# Patient Record
Sex: FEMALE | Race: WHITE | NOT HISPANIC OR LATINO | Employment: UNEMPLOYED | URBAN - METROPOLITAN AREA
[De-identification: names, ages, dates, MRNs, and addresses within clinical notes are randomized per-mention and may not be internally consistent; named-entity substitution may affect disease eponyms.]

---

## 2020-04-01 ENCOUNTER — OFFICE VISIT (OUTPATIENT)
Dept: URGENT CARE | Facility: CLINIC | Age: 25
End: 2020-04-01
Payer: COMMERCIAL

## 2020-04-01 VITALS
HEART RATE: 109 BPM | SYSTOLIC BLOOD PRESSURE: 130 MMHG | HEIGHT: 65 IN | WEIGHT: 183.8 LBS | OXYGEN SATURATION: 100 % | BODY MASS INDEX: 30.62 KG/M2 | RESPIRATION RATE: 18 BRPM | DIASTOLIC BLOOD PRESSURE: 100 MMHG | TEMPERATURE: 98.5 F

## 2020-04-01 DIAGNOSIS — J02.9 SORE THROAT: ICD-10-CM

## 2020-04-01 DIAGNOSIS — J02.0 STREP PHARYNGITIS: Primary | ICD-10-CM

## 2020-04-01 LAB — S PYO AG THROAT QL: POSITIVE

## 2020-04-01 PROCEDURE — 87880 STREP A ASSAY W/OPTIC: CPT | Performed by: NURSE PRACTITIONER

## 2020-04-01 PROCEDURE — 99213 OFFICE O/P EST LOW 20 MIN: CPT | Performed by: NURSE PRACTITIONER

## 2020-04-01 RX ORDER — METHYLPREDNISOLONE 4 MG/1
TABLET ORAL
Qty: 21 TABLET | Refills: 0 | Status: SHIPPED | OUTPATIENT
Start: 2020-04-01

## 2020-04-01 RX ORDER — METHYLPREDNISOLONE SODIUM SUCCINATE 40 MG/ML
60 INJECTION, POWDER, LYOPHILIZED, FOR SOLUTION INTRAMUSCULAR; INTRAVENOUS ONCE
Status: COMPLETED | OUTPATIENT
Start: 2020-04-01 | End: 2020-04-01

## 2020-04-01 RX ORDER — CLINDAMYCIN HYDROCHLORIDE 300 MG/1
300 CAPSULE ORAL 3 TIMES DAILY
Qty: 30 CAPSULE | Refills: 0 | Status: SHIPPED | OUTPATIENT
Start: 2020-04-01 | End: 2020-04-11

## 2020-04-01 RX ADMIN — METHYLPREDNISOLONE SODIUM SUCCINATE 60 MG: 40 INJECTION, POWDER, LYOPHILIZED, FOR SOLUTION INTRAMUSCULAR; INTRAVENOUS at 10:16

## 2022-02-08 ENCOUNTER — TELEPHONE (OUTPATIENT)
Dept: PSYCHIATRY | Facility: CLINIC | Age: 27
End: 2022-02-08

## 2022-03-01 ENCOUNTER — OFFICE VISIT (OUTPATIENT)
Dept: URGENT CARE | Facility: CLINIC | Age: 27
End: 2022-03-01
Payer: MEDICARE

## 2022-03-01 VITALS — TEMPERATURE: 97.4 F | HEART RATE: 75 BPM | OXYGEN SATURATION: 98 % | RESPIRATION RATE: 18 BRPM

## 2022-03-01 DIAGNOSIS — H61.21 IMPACTED CERUMEN OF RIGHT EAR: Primary | ICD-10-CM

## 2022-03-01 PROCEDURE — 69209 REMOVE IMPACTED EAR WAX UNI: CPT | Performed by: NURSE PRACTITIONER

## 2022-03-01 PROCEDURE — 99213 OFFICE O/P EST LOW 20 MIN: CPT | Performed by: NURSE PRACTITIONER

## 2022-03-01 NOTE — PROGRESS NOTES
[facility]        NAME: Sanford Espinoza is a 31 y o  female  : 1995    MRN: [id number]  DATE: 2022  TIME: 8:59 AM    Assessment and Plan   Impacted cerumen of right ear [H61 21]  1  Impacted cerumen of right ear       Ear cerumen removal    Date/Time: 3/1/2022 9:28 AM  Performed by: Anam Sims  Authorized by: JUNAID Khan   Universal Protocol:  Consent: Verbal consent obtained  Risks and benefits: risks, benefits and alternatives were discussed  Consent given by: patient  Time out: Immediately prior to procedure a "time out" was called to verify the correct patient, procedure, equipment, support staff and site/side marked as required  Patient understanding: patient states understanding of the procedure being performed  Patient identity confirmed: verbally with patient      Patient location:  Clinic  Procedure details:     Location:  R ear    Procedure type: irrigation only      Approach:  External  Post-procedure details:     Complication:  None    Hearing quality:  Normal    Patient tolerance of procedure: Tolerated well, no immediate complications  Comments:      Incomplete removal          Patient Instructions     Patient Instructions   Recommend using OTC debrox as directed as wax is impacted  Return if symptoms persist  Advise f/u with ENT          Follow up with PCP in 3-5 days  Proceed to  ER if symptoms worsen  Chief Complaint     Chief Complaint   Patient presents with    ringing in the ears     started last night         History of Present Illness       Earache   There is pain in the right (denies pain) ear  This is a new problem  The current episode started yesterday  The problem has been unchanged  There has been no fever  The patient is experiencing no pain  Pertinent negatives include no coughing, ear discharge, headaches, hearing loss, rhinorrhea, sore throat or vomiting  Associated symptoms comments: tinnitus      She has tried ear drops for the symptoms  The treatment provided no relief  There is no history of a chronic ear infection, hearing loss or a tympanostomy tube  Review of Systems   Review of Systems   Constitutional: Negative for chills, diaphoresis, fatigue and fever  HENT: Positive for ear pain and tinnitus  Negative for congestion, ear discharge, hearing loss, rhinorrhea, sinus pressure, sinus pain, sore throat and trouble swallowing  Respiratory: Negative for cough, shortness of breath, wheezing and stridor  Cardiovascular: Negative  Gastrointestinal: Negative for vomiting  Neurological: Negative for dizziness, light-headedness and headaches  Current Medications       Current Outpatient Medications:     methylprednisolone (MEDROL) 4 mg tablet, Medrol dose pack Take as directed  (Patient not taking: Reported on 3/1/2022 ), Disp: 21 tablet, Rfl: 0    Current Allergies     Allergies as of 03/01/2022 - Reviewed 03/01/2022   Allergen Reaction Noted    Amoxicillin  04/01/2020            The following portions of the patient's history were reviewed and updated as appropriate: allergies, current medications, past family history, past medical history, past social history, past surgical history and problem list      Past Medical History:   Diagnosis Date    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[facility])        History reviewed  No pertinent surgical history  Family History   Problem Relation Age of Onset    COPD Mother     Bipolar disorder Mother     Schizoaffective Disorder  Mother          Medications have been verified  Objective   Pulse 75   Temp (!) 97 4 °F (36 3 °C)   Resp 18   SpO2 98%   No LMP recorded  Patient has had an injection  Physical Exam     Physical Exam  Constitutional:       General: She is not in acute distress  Appearance: She is well-developed  She is not diaphoretic  HENT:      Head: Normocephalic and atraumatic        Right Ear: Hearing, ear canal and external ear normal  There is impacted cerumen  Left Ear: Hearing, tympanic membrane, ear canal and external ear normal       Nose: Nose normal       Right Sinus: No maxillary sinus tenderness or frontal sinus tenderness  Left Sinus: No maxillary sinus tenderness or frontal sinus tenderness  Mouth/Throat:      Pharynx: Oropharynx is clear  Uvula midline  Cardiovascular:      Rate and Rhythm: Normal rate and regular rhythm  Heart sounds: Normal heart sounds, S1 normal and S2 normal    Pulmonary:      Effort: Pulmonary effort is normal       Breath sounds: Normal breath sounds  Lymphadenopathy:      Cervical: No cervical adenopathy  Skin:     General: Skin is warm and dry  Neurological:      Mental Status: She is alert and oriented to person, place, and time

## 2022-03-01 NOTE — PATIENT INSTRUCTIONS
Recommend using OTC debrox as directed as wax is impacted   Return if symptoms persist  Advise f/u with ENT

## 2022-07-15 ENCOUNTER — OFFICE VISIT (OUTPATIENT)
Dept: URGENT CARE | Facility: CLINIC | Age: 27
End: 2022-07-15
Payer: MEDICARE

## 2022-07-15 VITALS
WEIGHT: 202 LBS | DIASTOLIC BLOOD PRESSURE: 86 MMHG | HEIGHT: 65 IN | RESPIRATION RATE: 18 BRPM | SYSTOLIC BLOOD PRESSURE: 138 MMHG | HEART RATE: 86 BPM | OXYGEN SATURATION: 98 % | TEMPERATURE: 97.5 F | BODY MASS INDEX: 33.66 KG/M2

## 2022-07-15 DIAGNOSIS — H60.331 ACUTE SWIMMER'S EAR OF RIGHT SIDE: Primary | ICD-10-CM

## 2022-07-15 PROCEDURE — 99213 OFFICE O/P EST LOW 20 MIN: CPT | Performed by: NURSE PRACTITIONER

## 2022-07-15 RX ORDER — OFLOXACIN 3 MG/ML
10 SOLUTION AURICULAR (OTIC) DAILY
Qty: 5 ML | Refills: 0 | Status: SHIPPED | OUTPATIENT
Start: 2022-07-15

## 2022-07-15 NOTE — PATIENT INSTRUCTIONS
Use drops as directed  Recommend over-the-counter Tylenol or Motrin as needed for pain  If you develop any increased pain, swelling, prolonged high fever, dizziness, or any new or concerning symptoms please return or proceed ER  Advised follow-up with PCP in 3 to 5 days

## 2022-07-15 NOTE — PROGRESS NOTES
[facility]        NAME: Fanta Addison is a 31 y o  female  : 1995    MRN: [id number]  DATE: July 15, 2022  TIME: 5:34 PM    Assessment and Plan   Acute swimmer's ear of right side [H60 331]  1  Acute swimmer's ear of right side  ofloxacin (FLOXIN) 0 3 % otic solution         Patient Instructions     Patient Instructions   Use drops as directed  Recommend over-the-counter Tylenol or Motrin as needed for pain  If you develop any increased pain, swelling, prolonged high fever, dizziness, or any new or concerning symptoms please return or proceed ER  Advised follow-up with PCP in 3 to 5 days  Follow up with PCP in 3-5 days  Proceed to  ER if symptoms worsen  Chief Complaint     Chief Complaint   Patient presents with    Earache     Patient c/o of right ear pain that started a few days ago  History of Present Illness       Earache   There is pain in the right ear  This is a new problem  Episode onset: 3 days ago  The problem occurs constantly  The problem has been unchanged  There has been no fever  The pain is moderate  Pertinent negatives include no abdominal pain, coughing, diarrhea, ear discharge, headaches, hearing loss, neck pain, rash, rhinorrhea, sore throat or vomiting  She has tried nothing for the symptoms  Her past medical history is significant for a tympanostomy tube (as a child)  There is no history of a chronic ear infection or hearing loss  Review of Systems   Review of Systems   Constitutional: Negative for chills, diaphoresis, fatigue and fever  HENT: Positive for ear pain  Negative for congestion, ear discharge, facial swelling, hearing loss, mouth sores, postnasal drip, rhinorrhea, sinus pressure, sinus pain, sore throat and trouble swallowing  Eyes: Negative for photophobia and visual disturbance  Respiratory: Negative for cough, chest tightness and shortness of breath  Cardiovascular: Negative for chest pain     Gastrointestinal: Negative for abdominal pain, diarrhea, nausea and vomiting  Genitourinary: Negative  Musculoskeletal: Negative for arthralgias, back pain, joint swelling, myalgias, neck pain and neck stiffness  Skin: Negative for rash  Neurological: Negative for dizziness, facial asymmetry, weakness, light-headedness, numbness and headaches  Current Medications       Current Outpatient Medications:     ofloxacin (FLOXIN) 0 3 % otic solution, Administer 10 drops to the right ear daily, Disp: 5 mL, Rfl: 0    methylprednisolone (MEDROL) 4 mg tablet, Medrol dose pack Take as directed  (Patient not taking: No sig reported), Disp: 21 tablet, Rfl: 0    Current Allergies     Allergies as of 07/15/2022 - Reviewed 07/15/2022   Allergen Reaction Noted    Amoxicillin  04/01/2020            The following portions of the patient's history were reviewed and updated as appropriate: allergies, current medications, past family history, past medical history, past social history, past surgical history and problem list      Past Medical History:   Diagnosis Date    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[facility])        History reviewed  No pertinent surgical history  Family History   Problem Relation Age of Onset    COPD Mother     Bipolar disorder Mother     Schizoaffective Disorder  Mother          Medications have been verified  Objective   /86   Pulse 86   Temp 97 5 °F (36 4 °C) (Temporal)   Resp 18   Ht 5' 5" (1 651 m)   Wt 91 6 kg (202 lb)   SpO2 98%   BMI 33 61 kg/m²   No LMP recorded  Physical Exam     Physical Exam  Constitutional:       General: She is not in acute distress  Appearance: She is well-developed  HENT:      Head: Normocephalic and atraumatic  Right Ear: Hearing, tympanic membrane, ear canal and external ear normal  Drainage, swelling (of right ear canal) and tenderness present  Tympanic membrane is not erythematous        Left Ear: Hearing, tympanic membrane, ear canal and external ear normal  Tympanic membrane is not erythematous  Nose: Nose normal       Right Sinus: No maxillary sinus tenderness or frontal sinus tenderness  Left Sinus: No maxillary sinus tenderness or frontal sinus tenderness  Mouth/Throat:      Mouth: Mucous membranes are moist       Pharynx: Oropharynx is clear  Uvula midline  No oropharyngeal exudate or posterior oropharyngeal erythema  Eyes:      Conjunctiva/sclera: Conjunctivae normal       Pupils: Pupils are equal, round, and reactive to light  Cardiovascular:      Rate and Rhythm: Normal rate and regular rhythm  Heart sounds: Normal heart sounds, S1 normal and S2 normal    Pulmonary:      Effort: Pulmonary effort is normal       Breath sounds: Normal breath sounds and air entry  Lymphadenopathy:      Cervical: No cervical adenopathy  Skin:     General: Skin is warm and dry  Capillary Refill: Capillary refill takes less than 2 seconds  Neurological:      Mental Status: She is alert and oriented to person, place, and time

## 2023-09-05 ENCOUNTER — TELEPHONE (OUTPATIENT)
Dept: PSYCHIATRY | Facility: CLINIC | Age: 28
End: 2023-09-05

## 2023-09-05 NOTE — LETTER
Dear Michele Fears : We are contacting you because your name is currently included on the [address] [place] [address] wait-list for Talk Therapy and/or Medication Management. (Please [place] which services are needed)     In our efforts to provide the highest quality care, [facility] has begun the process of upgrading our behavioral health systems to increase efficiency and expedite delivery of services. As part of this process, we ask you to please confirm your continued interest in the services above. If you are no longer interested or in need, please zackery “No” in the area below. If you are still interested and in need, please zackery “Yes” and provide your most current demographic and insurance information within 15 days. If we do not receive confirmation from you by 2023 your information will not be included in the system upgrade and your place on the waitlist will be lost.     Thank you in advance for your patience and understanding and we apologize for any inconvenience this may cause. Patient Name and :    Still in need of services: Yes or No     Current Address:     Phone#:     Best time to receive a call: Insurance Carrier:      Policy/ID#: Group#: Insurance Services Phone#:      What is your current presenting problem? Open to virtual talk therapy: Yes or No      We will call you to do an Intake when an appointment becomes available. You can send this information back to us in any of the ways below:    Email: [email or web address] Anamaria Tellez  Fax#:  [phone]  Mail:   [address] [place] [address]             [address] [facility], [address] [place] [address]

## 2024-06-11 ENCOUNTER — TELEPHONE (OUTPATIENT)
Dept: PSYCHIATRY | Facility: CLINIC | Age: 29
End: 2024-06-11

## 2024-06-11 NOTE — TELEPHONE ENCOUNTER
Contacted patient off of Talk Therapy  wait list to verify needs of services in attempts to update list with patient preferences. Number is not available at this time.    2nd call attempt, removed from wait list.

## 2024-06-13 ENCOUNTER — OFFICE VISIT (OUTPATIENT)
Dept: URGENT CARE | Facility: CLINIC | Age: 29
End: 2024-06-13
Payer: COMMERCIAL

## 2024-06-13 VITALS
HEART RATE: 98 BPM | DIASTOLIC BLOOD PRESSURE: 90 MMHG | HEIGHT: 65 IN | OXYGEN SATURATION: 98 % | TEMPERATURE: 98 F | BODY MASS INDEX: 31.16 KG/M2 | SYSTOLIC BLOOD PRESSURE: 138 MMHG | RESPIRATION RATE: 18 BRPM | WEIGHT: 187 LBS

## 2024-06-13 DIAGNOSIS — T16.2XXA EAR FOREIGN BODY, LEFT, INITIAL ENCOUNTER: Primary | ICD-10-CM

## 2024-06-13 PROCEDURE — 99213 OFFICE O/P EST LOW 20 MIN: CPT

## 2024-06-13 PROCEDURE — 69200 CLEAR OUTER EAR CANAL: CPT

## 2024-06-13 RX ORDER — HYDROCHLOROTHIAZIDE 25 MG/1
25 TABLET ORAL DAILY
COMMUNITY

## 2024-06-13 NOTE — PROGRESS NOTES
[facility]        NAME: Debbi Coronado is a 28 y.o. female  : 1995    MRN: [id number]  DATE: 2024  TIME: 4:47 PM    Assessment and Plan   Ear foreign body, left, initial encounter [T16.2XXA]  1. Ear foreign body, left, initial encounter        Small piece of cotton removed from the left ear      Patient Instructions   Do not put any Q-tips in your ears    Follow up with PCP in 3-5 days.  Proceed to  ER if symptoms worsen.    If tests have been performed at [facility], our office will contact you with results if changes need to be made to the care plan discussed with you at the visit.  [facility]    Chief Complaint     Chief Complaint   Patient presents with    Foreign Body in Ear     Q-tip in left ear since yesterday         History of Present Illness       This is a 28-year-old female who presents today with cotton tip forma q-tip stuck in her left ear since last night She denies any pain states that this feels like something is in her ear.  Piece of cotton pulled from the left ear.  No redness or swelling noted    Foreign Body in Ear  Chest pain: fullness.       Review of Systems   Review of Systems   Constitutional: Negative.    HENT:  Positive for ear pain.    Respiratory: Negative.     Cardiovascular: Negative.  Chest pain: fullness.   Gastrointestinal: Negative.    Genitourinary: Negative.    Neurological: Negative.          Current Medications       Current Outpatient Medications:     hydroCHLOROthiazide 25 mg tablet, Take 25 mg by mouth daily, Disp: , Rfl:     methylprednisolone (MEDROL) 4 mg tablet, Medrol dose pack Take as directed. (Patient not taking: Reported on 3/1/2022), Disp: 21 tablet, Rfl: 0    ofloxacin (FLOXIN) 0.3 % otic solution, Administer 10 drops to the right ear daily (Patient not taking: Reported on 2024), Disp: 5 mL, Rfl: 0    Current Allergies     Allergies as of 2024 - Reviewed 2024   Allergen  "Reaction Noted    Amoxicillin Hives 04/01/2020            The following portions of the patient's history were reviewed and updated as appropriate: allergies, current medications, past family history, past medical history, past social history, past surgical history and problem list.     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    Family History   Problem Relation Age of Onset    COPD Mother     Bipolar disorder Mother     Schizoaffective Disorder  Mother          Medications have been verified.        Objective   /90   Pulse 98   Temp 98 °F (36.7 °C)   Resp 18   Ht 5' 5\" (1.651 m)   Wt 84.8 kg (187 lb)   SpO2 98%   BMI 31.12 kg/m²   No LMP recorded.       Physical Exam     Physical Exam  Vitals reviewed.   Constitutional:       General: She is not in acute distress.     Appearance: Normal appearance. She is not ill-appearing.   HENT:      Head: Normocephalic and atraumatic.      Ears:        Comments: Small piece of cotton removed from the left ear isela.  No erythema or drainage noted  Eyes:      Extraocular Movements: Extraocular movements intact.      Conjunctiva/sclera: Conjunctivae normal.   Pulmonary:      Effort: Pulmonary effort is normal.   Skin:     General: Skin is warm.   Neurological:      General: No focal deficit present.      Mental Status: She is alert.   Psychiatric:         Mood and Affect: Mood normal.         Behavior: Behavior normal.         Judgment: Judgment normal.                   "

## 2024-07-26 ENCOUNTER — RA CDI HCC (OUTPATIENT)
Dept: OTHER | Facility: HOSPITAL | Age: 29
End: 2024-07-26

## 2024-08-08 ENCOUNTER — OFFICE VISIT (OUTPATIENT)
Dept: FAMILY MEDICINE CLINIC | Facility: CLINIC | Age: 29
End: 2024-08-08
Payer: COMMERCIAL

## 2024-08-08 VITALS
DIASTOLIC BLOOD PRESSURE: 82 MMHG | HEIGHT: 65 IN | SYSTOLIC BLOOD PRESSURE: 128 MMHG | HEART RATE: 88 BPM | BODY MASS INDEX: 32.06 KG/M2 | OXYGEN SATURATION: 98 % | TEMPERATURE: 98.9 F | WEIGHT: 192.4 LBS

## 2024-08-08 DIAGNOSIS — I69.051 SPASTIC HEMIPLEGIA OF RIGHT DOMINANT SIDE AS LATE EFFECT OF NONTRAUMATIC SUBARACHNOID HEMORRHAGE (HCC): ICD-10-CM

## 2024-08-08 DIAGNOSIS — Z76.89 ENCOUNTER TO ESTABLISH CARE WITH NEW DOCTOR: ICD-10-CM

## 2024-08-08 DIAGNOSIS — F41.9 ANXIETY: ICD-10-CM

## 2024-08-08 DIAGNOSIS — F32.A DEPRESSION, UNSPECIFIED DEPRESSION TYPE: Primary | ICD-10-CM

## 2024-08-08 PROBLEM — Z86.69 HISTORY OF SEIZURE DISORDER: Status: ACTIVE | Noted: 2024-03-18

## 2024-08-08 PROCEDURE — 99204 OFFICE O/P NEW MOD 45 MIN: CPT | Performed by: FAMILY MEDICINE

## 2024-08-08 RX ORDER — HYDROXYZINE HYDROCHLORIDE 25 MG/1
25 TABLET, FILM COATED ORAL
Qty: 30 TABLET | Refills: 1 | Status: SHIPPED | OUTPATIENT
Start: 2024-08-08

## 2024-08-08 RX ORDER — ARIPIPRAZOLE 15 MG/1
15 TABLET ORAL DAILY
COMMUNITY
Start: 2024-07-24 | End: 2024-08-16 | Stop reason: SDUPTHER

## 2024-08-08 RX ORDER — HYDRALAZINE HYDROCHLORIDE 25 MG/1
25 TABLET, FILM COATED ORAL DAILY
COMMUNITY
Start: 2024-07-24

## 2024-08-08 RX ORDER — ESCITALOPRAM OXALATE 20 MG/1
20 TABLET ORAL DAILY
COMMUNITY
Start: 2024-07-24 | End: 2024-08-20 | Stop reason: SDUPTHER

## 2024-08-08 RX ORDER — NORETHINDRONE 0.35 MG
1 KIT ORAL DAILY
COMMUNITY
Start: 2024-03-14

## 2024-08-09 ENCOUNTER — TELEPHONE (OUTPATIENT)
Age: 29
End: 2024-08-09

## 2024-08-09 NOTE — TELEPHONE ENCOUNTER
"STAT referral received 8/8/24 from PCP for MM; outreach call made to inquire about client's interest in services. Client wished to proceed with scheduling.       Behavioral Health Outpatient Intake Questions    Referred By   : PCP    Please advise interviewee that they need to answer all questions truthfully to allow for best care, and any misrepresentations of information may affect their ability to be seen at this clinic   => Was this discussed? Yes       Behavioral Health Outpatient Intake History -     Presenting Problem (in patient's own words): Anxiety and depression    Are there any communication barriers for this patient?     No                                                 Are you taking any psychiatric medications? Yes     If \"YES\" -What are they Lexapro, Abilify, Atarax    If \"YES\" -Who prescribes? [facility] (Lexapro and Abilify) and PCP (Atarax)    Has the Patient previously received outpatient Talk Therapy or Medication Management from [facility]  No        If \"YES\"- When, Where and with Whom?         If \"NO\" -Has Patient received these services elsewhere? Yes      If \"YES\" -When, Where, and with Whom? Therapy in [place] (private practice) several years ago. Does not remember the name of whom she was seeing.    Has the Patient abused alcohol or other substances in the last 6 months ? No  No concerns of substance abuse are reported.      Legal History-     Is this treatment court ordered? No     Has the Patient been convicted of a felony?  No    ACCEPTED as a patient Yes  If \"Yes\" Appointment Date: 9/6/24 at 9:00 AM with Farrukh Moreno    Referred Elsewhere? No    Name of Insurance Co: [employer] [facility]  Insurance ID# [id number]   Insurance Phone #  If ins is primary or secondary? [employer] = Primary                                                       [place] = Secondary  If patient is a minor, parents information such as Name, D.O.B of guarantor.  "

## 2024-08-12 ENCOUNTER — TELEPHONE (OUTPATIENT)
Dept: PSYCHIATRY | Facility: CLINIC | Age: 29
End: 2024-08-12

## 2024-08-12 NOTE — TELEPHONE ENCOUNTER
LMOM informing patient that f/u with Farrukh Moreno on 10/4 needs to be rescheduled due to provider being out of office that day.     Please call back at [phone].     Please note new patient appointment on 9/6 at 9am is staying the same.

## 2024-08-16 ENCOUNTER — TELEPHONE (OUTPATIENT)
Dept: PSYCHIATRY | Facility: CLINIC | Age: 29
End: 2024-08-16

## 2024-08-16 DIAGNOSIS — F32.A DEPRESSION, UNSPECIFIED DEPRESSION TYPE: ICD-10-CM

## 2024-08-16 DIAGNOSIS — Z86.59 HISTORY OF SUICIDAL IDEATION: Primary | ICD-10-CM

## 2024-08-16 RX ORDER — ARIPIPRAZOLE 15 MG/1
15 TABLET ORAL DAILY
Qty: 7 TABLET | Refills: 0 | Status: SHIPPED | OUTPATIENT
Start: 2024-08-16

## 2024-08-16 NOTE — TELEPHONE ENCOUNTER
Reason for call: Patient's mother was wondering if her PCP can fill medication until patient's upcoming appt with the psychiatrist on 09/06/2024.   [x] Refill   [] Prior Auth  [] Other:     Office: [facility] [place]  [x] PCP/Provider - Ryan Diaz   [] Specialty/Provider -     Medication: ARIPiprazole (ABILIFY)     Dose/Frequency: 15 mg/ daily     Quantity: 30 day supply     Pharmacy: [facility] in [place]     Does the patient have enough for 3 days?   [x] Yes   [] No - Send as HP to POD

## 2024-08-16 NOTE — TELEPHONE ENCOUNTER
One week follow up call for New Patient appointment with Farrukh Moreno [[id number]] on 9/6/24  was made on 8/9/24. Writer informed patient of New Patient paperwork needing to be completed 5 days prior to the appointment. Writer confirmed paperwork has been sent via Mail.

## 2024-08-20 DIAGNOSIS — F32.A DEPRESSION, UNSPECIFIED DEPRESSION TYPE: Primary | ICD-10-CM

## 2024-08-20 RX ORDER — ESCITALOPRAM OXALATE 20 MG/1
20 TABLET ORAL DAILY
Qty: 5 TABLET | Refills: 0 | Status: SHIPPED | OUTPATIENT
Start: 2024-08-20 | End: 2024-08-26 | Stop reason: SDUPTHER

## 2024-08-20 NOTE — TELEPHONE ENCOUNTER
Covering for pt's PMD  There is no note or documentation in the new pt encounter here 8/8/24 except for an order for a STAT psych consult and rx for hydroxyzine  I will authorize a courtesy 5 day supply - her PMD returns in 2 days

## 2024-08-22 ENCOUNTER — TELEPHONE (OUTPATIENT)
Age: 29
End: 2024-08-22

## 2024-08-22 NOTE — TELEPHONE ENCOUNTER
Patient called requesting refill for   Escitalopram Patient made aware medication was refilled on 8/20 for 5 with 0 refills to [facility] pharmacy. Patient instructed to contact the pharmacy to obtain refills of medication. Patient verbalized understanding.

## 2024-08-22 NOTE — TELEPHONE ENCOUNTER
Patients mother called in requesting a referral for psych be faxed over to new PCP office because then she might be able to be seen sooner that way. Mother did not have fax number at time of call and when I looked online there are a few fax numbers but with different addresses. To ensure referral is being faxed to the correct location I advised the patients mother to call current PCP office for fax number and give us a call back. NFA needed at this time.

## 2024-08-25 NOTE — PROGRESS NOTES
"Ambulatory Visit  Name: Debbi Coronado      : 1995      MRN: [id number]  Encounter Provider: Ryan Diaz MD  Encounter Date: 2024   Encounter department: [facility] [place]    Assessment & Plan   1. Depression, unspecified depression type  -     Ambulatory referral to Psych Services; Future  -     hydrOXYzine HCL (ATARAX) 25 mg tablet; Take 1 tablet (25 mg total) by mouth daily at bedtime as needed for anxiety  2. Spastic hemiplegia of right dominant side as late effect of nontraumatic subarachnoid hemorrhage (HCC)  3. Encounter to establish care with new doctor  4. Anxiety    Worsening acute on chronic depression with anxiety.  She has history of suicidal ideation and cutting behavior. Patient was admitted to subsequently discharged without psych provider.  Referral placed today.  We will Atarax as needed for anxiety at bedtime to help her sleep as she reports this is when her anxiety is at worst.  Continue Abilify 15 mg with lexapro 20 mg.  Discharge regiment going on it  for couple weeks.       History of Present Illness     Patient presents to the office to establish care today.  Recent discharge from inpatient psychiatric hospital for depression with suicidal ideation and cutting behavior.  She recently got a tattoo and she does not like it and makes her   anxiety worse.  She denies any recent suicidal ideation or self injury since discharge .  She has a history of stroke in utero leaving her with hemiparesis .         Review of Systems   All other systems reviewed and are negative.      Objective     /82   Pulse 88   Temp 98.9 °F (37.2 °C)   Ht 5' 5\" (1.651 m)   Wt 87.3 kg (192 lb 6.4 oz)   SpO2 98%   BMI 32.02 kg/m²     Physical Exam  Vitals and nursing note reviewed.   Constitutional:       General: She is not in acute distress.     Appearance: Normal appearance. She is well-developed. She is not ill-appearing, toxic-appearing or diaphoretic.   HENT:      " Head: Normocephalic and atraumatic.   Eyes:      General:         Right eye: No discharge.         Left eye: No discharge.      Extraocular Movements: Extraocular movements intact.      Conjunctiva/sclera: Conjunctivae normal.   Cardiovascular:      Rate and Rhythm: Normal rate.   Pulmonary:      Effort: Pulmonary effort is normal.   Skin:     General: Skin is warm and dry.      Capillary Refill: Capillary refill takes less than 2 seconds.   Neurological:      Mental Status: She is alert and oriented to person, place, and time.   Psychiatric:         Mood and Affect: Mood normal.         Behavior: Behavior normal.         Thought Content: Thought content normal.         Judgment: Judgment normal.       Administrative Statements

## 2024-08-26 DIAGNOSIS — F32.A DEPRESSION, UNSPECIFIED DEPRESSION TYPE: ICD-10-CM

## 2024-08-26 DIAGNOSIS — Z86.59 HISTORY OF SUICIDAL IDEATION: ICD-10-CM

## 2024-08-26 NOTE — TELEPHONE ENCOUNTER
Patient moved and is in the process of establishing care with a new provider but is asking if she can have a month supply of her meds until she is able to be seen   Reason for call:   [x] Refill   [] Prior Auth  [] Other:     Office:   [x] PCP/Provider -   [] Specialty/Provider -         Does the patient have enough for 3 days?   [] Yes   [x] No - Send as HP to POD

## 2024-08-27 RX ORDER — ARIPIPRAZOLE 15 MG/1
15 TABLET ORAL DAILY
Qty: 90 TABLET | Refills: 0 | Status: SHIPPED | OUTPATIENT
Start: 2024-08-27

## 2024-08-27 RX ORDER — ESCITALOPRAM OXALATE 20 MG/1
20 TABLET ORAL DAILY
Qty: 90 TABLET | Refills: 0 | Status: SHIPPED | OUTPATIENT
Start: 2024-08-27

## 2024-08-27 NOTE — TELEPHONE ENCOUNTER
Patient called the RX Refill Line. Message is being forwarded to the office.     Patient is requesting a call back. Patient stated she is returning a call she received. Informed patient a detailed voice message was left. Patient did not listen to voice mail and is requesting a call back.    Please contact patient at [phone]

## 2024-09-11 ENCOUNTER — TELEPHONE (OUTPATIENT)
Age: 29
End: 2024-09-11

## 2024-09-11 NOTE — TELEPHONE ENCOUNTER
Patient would like to r/s her canceled NP appt with Bradley Moreno. Patient moved but is still interested in services at the [facility] that is closer to her new address. Patient placed back on the wait list for MM.

## 2024-09-13 ENCOUNTER — TELEPHONE (OUTPATIENT)
Age: 29
End: 2024-09-13

## 2024-09-13 NOTE — TELEPHONE ENCOUNTER
Patient called office needing to r/s appointment but in a different location. Writer was in contact with IC and per IC patient is to be placed back on wait list for new opening closer to where patient lives. Patient is aware she was placed on wait list.

## 2024-09-18 ENCOUNTER — TELEPHONE (OUTPATIENT)
Dept: FAMILY MEDICINE CLINIC | Facility: CLINIC | Age: 29
End: 2024-09-18

## 2024-09-18 NOTE — TELEPHONE ENCOUNTER
Spoke with pt in regards to annual medicare vist. Pt states she is no longer in the area and established care with [facility].

## 2024-09-29 DIAGNOSIS — F32.A DEPRESSION, UNSPECIFIED DEPRESSION TYPE: ICD-10-CM

## 2024-09-29 RX ORDER — HYDROXYZINE HYDROCHLORIDE 25 MG/1
25 TABLET, FILM COATED ORAL
Qty: 30 TABLET | Refills: 1 | Status: SHIPPED | OUTPATIENT
Start: 2024-09-29

## 2024-11-24 DIAGNOSIS — F32.A DEPRESSION, UNSPECIFIED DEPRESSION TYPE: ICD-10-CM

## 2024-11-26 RX ORDER — ESCITALOPRAM OXALATE 20 MG/1
20 TABLET ORAL DAILY
Qty: 90 TABLET | Refills: 1 | Status: SHIPPED | OUTPATIENT
Start: 2024-11-26